# Patient Record
Sex: MALE | Race: AMERICAN INDIAN OR ALASKA NATIVE
[De-identification: names, ages, dates, MRNs, and addresses within clinical notes are randomized per-mention and may not be internally consistent; named-entity substitution may affect disease eponyms.]

---

## 2019-03-28 ENCOUNTER — HOSPITAL ENCOUNTER (EMERGENCY)
Dept: HOSPITAL 31 - C.ER | Age: 64
Discharge: HOME | End: 2019-03-28
Payer: COMMERCIAL

## 2019-03-28 VITALS
SYSTOLIC BLOOD PRESSURE: 134 MMHG | OXYGEN SATURATION: 99 % | RESPIRATION RATE: 18 BRPM | HEART RATE: 69 BPM | DIASTOLIC BLOOD PRESSURE: 80 MMHG | TEMPERATURE: 98.9 F

## 2019-03-28 DIAGNOSIS — S69.92XA: Primary | ICD-10-CM
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
## 2019-03-28 NOTE — RAD
PROCEDURE:  Left Hand Radiographs.



HISTORY:

 fall, hand pain and swelling 



COMPARISON:

None available.



FINDINGS:



BONES:

No acute displaced fracture.  Degenerative changes. 



JOINTS:

No dislocation.  Mild joint space narrowing at the 1st 

carpal/metacarpal. 



SOFT TISSUES:

Unremarkable. No evidence of radiopaque foreign body. 



OTHER FINDINGS:

None.



IMPRESSION:

No acute displaced fracture, dislocation, or significant joint 

effusion identified.



If symptoms persist, or if there is continued clinical concern, x-ray 

follow-up in 7-10 days should be considered.

## 2019-03-28 NOTE — C.PDOC
History Of Present Illness


Patient is a 63 year old male with no past medical history who presents with 

left hand pain and swelling. He was walking on uneven pavement yesterday around 

5pm tripped and fell onto his extended left hand. He complains of left hand 

swelling and pain with movement of his fingers. He denies head trauma, extremity

trauma and states he only injured his left hand. He applied ice, took two Advil 

at 10pm last night, and applied an OTC topical ointment for pain. He has no 

other complaints and denies chest pain, palpitations, dizziness, difficulty 

walking, loss of balance, and joint pain.





PMHx/SurgHx: denies


FamHx: Mother- DM


SocHx: drinks socials (2 whiskeys on weekends); denies tobacco and drug use. 

Works in a warehouse.


Allergies: NKDA


Medications: daily multivitamin


Chief Complaint (Nursing): Upper Extremity Problem/Injury





Past Medical History


Vital Signs: 





                                Last Vital Signs











Temp  99.2 F   03/28/19 08:55


 


Pulse  80   03/28/19 08:55


 


Resp  17   03/28/19 08:55


 


BP  148/84   03/28/19 08:55


 


Pulse Ox  96   03/28/19 08:55











Family History: States: Diabetes, Hypertension





- Social History


Hx Alcohol Use: Yes


Hx Substance Use: No





- Immunization History


Hx Tetanus Toxoid Vaccination: No


Hx Influenza Vaccination: No


Hx Pneumococcal Vaccination: Yes (04/2018)





Review Of Systems


Constitutional: Negative for: Weakness


Eyes: Negative for: Vision Change


Cardiovascular: Negative for: Chest Pain, Palpitations


Respiratory: Negative for: Shortness of Breath


Musculoskeletal: Positive for: Hand Pain (left hand pain).  Negative for: Neck 

Pain, Shoulder Pain, Arm Pain, Back Pain, Leg Pain, Foot Pain


Skin: Positive for: Bruising (palmar aspect of left hand)


Neurological: Negative for: Weakness, Numbness, Headache, Dizziness





Physical Exam





- Physical Exam


Appears: Well, Non-toxic, No Acute Distress


Skin: Warm, Dry, Ecchymosis (palm of left hand (MCP))


Head: Atraumatic, Normacephalic, No Tenderness, No Swelling, No Abrasion, No 

Laceration


Eye(s): bilateral: Normal Inspection


Neck: Normal ROM, No Midline Cervical Tenderness, No Paracervical Tenderness


Extremity: No Normal ROM (Left hand: decreased ROM of the digits 2-4 due to 

swelling and pain; full ROM of L thumb and L 5th digit), Tenderness (left MCP; 

no wrist/snuffbox/PIP/DIP tenderness), No Pedal Edema, No Calf Tenderness, 

Swelling (left hand- MCP; mild swelling in PIP of digits 2-4; no wrist/snuffbox 

swelling)


Pulses: Left Radial: Normal, Right Radial: Normal


Neurological/Psych: Oriented x3, Normal Speech, Normal Cognition, Normal 

Sensation





ED Course And Treatment


O2 Sat by Pulse Oximetry: 96





Medical Decision Making


Medical Decision Making: 


Orderd left hand xray and Motrin 600mg PO once for pain. Applied and ace wrap 

and ice to the left hand.





Disposition





- Disposition


Disposition: HOME/ ROUTINE


Disposition Time: 09:38


Condition: STABLE


Additional Instructions: 


Please apply ice to your left hand as needed for swelling. Elevate your left 

hand to decrease swelling. You may take OTC Advil as needed for pain and 

swelling. Please follow up with your PMD within one week of discharge. If 

symptoms worsen, please return to the nearest ER.


Instructions:  Hand Pain (DC)


Forms:  [facility] (English), Work Excuse





- Clinical Impression


Clinical Impression: 


 Hand injury